# Patient Record
Sex: FEMALE | Race: BLACK OR AFRICAN AMERICAN | Employment: OTHER | URBAN - METROPOLITAN AREA
[De-identification: names, ages, dates, MRNs, and addresses within clinical notes are randomized per-mention and may not be internally consistent; named-entity substitution may affect disease eponyms.]

---

## 2021-08-01 ENCOUNTER — HOSPITAL ENCOUNTER (EMERGENCY)
Facility: HOSPITAL | Age: 31
Discharge: HOME | End: 2021-08-01
Attending: STUDENT IN AN ORGANIZED HEALTH CARE EDUCATION/TRAINING PROGRAM | Admitting: STUDENT IN AN ORGANIZED HEALTH CARE EDUCATION/TRAINING PROGRAM
Payer: COMMERCIAL

## 2021-08-01 VITALS
WEIGHT: 137 LBS | OXYGEN SATURATION: 100 % | RESPIRATION RATE: 18 BRPM | DIASTOLIC BLOOD PRESSURE: 82 MMHG | HEART RATE: 73 BPM | TEMPERATURE: 99.2 F | BODY MASS INDEX: 22.82 KG/M2 | SYSTOLIC BLOOD PRESSURE: 136 MMHG | HEIGHT: 65 IN

## 2021-08-01 DIAGNOSIS — J02.9 ACUTE PHARYNGITIS, UNSPECIFIED ETIOLOGY: ICD-10-CM

## 2021-08-01 PROCEDURE — 250N000013 HC RX MED GY IP 250 OP 250 PS 637: Performed by: EMERGENCY MEDICINE

## 2021-08-01 PROCEDURE — 99283 EMERGENCY DEPT VISIT LOW MDM: CPT

## 2021-08-01 RX ORDER — HYDROCODONE BITARTRATE AND ACETAMINOPHEN 5; 325 MG/1; MG/1
1 TABLET ORAL ONCE
Status: COMPLETED | OUTPATIENT
Start: 2021-08-01 | End: 2021-08-01

## 2021-08-01 RX ORDER — PENICILLIN V POTASSIUM 250 MG/1
500 TABLET, FILM COATED ORAL ONCE
Status: COMPLETED | OUTPATIENT
Start: 2021-08-01 | End: 2021-08-01

## 2021-08-01 RX ORDER — PENICILLIN V POTASSIUM 250 MG/1
500 TABLET, FILM COATED ORAL ONCE
Status: DISCONTINUED | OUTPATIENT
Start: 2021-08-01 | End: 2021-08-01

## 2021-08-01 RX ORDER — HYDROCODONE BITARTRATE AND ACETAMINOPHEN 5; 325 MG/1; MG/1
1 TABLET ORAL ONCE
Status: DISCONTINUED | OUTPATIENT
Start: 2021-08-01 | End: 2021-08-01

## 2021-08-01 RX ORDER — PENICILLIN V POTASSIUM 500 MG/1
500 TABLET, FILM COATED ORAL 4 TIMES DAILY
Qty: 28 TABLET | Refills: 0 | Status: SHIPPED | OUTPATIENT
Start: 2021-08-01 | End: 2021-08-08

## 2021-08-01 RX ORDER — HYDROCODONE BITARTRATE AND ACETAMINOPHEN 5; 325 MG/1; MG/1
1 TABLET ORAL EVERY 6 HOURS PRN
Qty: 4 TABLET | Refills: 0 | Status: SHIPPED | OUTPATIENT
Start: 2021-08-01 | End: 2021-08-04

## 2021-08-01 RX ADMIN — PENICILLIN V POTASIUM 500 MG: 250 TABLET ORAL at 21:42

## 2021-08-01 RX ADMIN — HYDROCODONE BITARTRATE AND ACETAMINOPHEN 1 TABLET: 5; 325 TABLET ORAL at 21:42

## 2021-08-01 ASSESSMENT — MIFFLIN-ST. JEOR: SCORE: 1337.31

## 2021-08-02 NOTE — DISCHARGE INSTRUCTIONS
Follow-up with the dentist tomorrow for reevaluation.  Continue penicillin for 7 days.  Take ibuprofen 600 mg every 8 hours and Tylenol 1 g every 8 hours for pain management may use Vicodin 1 every 8 hours for pain not controlled with Tylenol ibuprofen.  Return to the emergency department if progressive symptoms including increased pain in your throat or swelling, any difficulty with breathing or swallowing or development of fever

## 2021-08-02 NOTE — ED PROVIDER NOTES
"EMERGENCY DEPARTMENT NOTE     Name: Laura Bloom    Age/Sex: 31 year old female   MRN: [id number]   Evaluation Date & Time:  8/1/2021  8:04 PM    PCP:    No primary care provider on file.   ED Provider: Jerrod Fletcher D.O.       CHIEF COMPLAINT    chief complaint    DIAGNOSIS & DISPOSITION     1. Acute pharyngitis, unspecified etiology      DISPOSITION: Home    At the conclusion of the encounter I discussed the results of all of the tests and the disposition. The questions were answered. The patient or family acknowledged understanding and was agreeable with the care plan.    TOTAL CRITICAL CARE TIME (EXCLUDING PROCEDURES): Not applicable    PROCEDURES:   None    EMERGENCY DEPARTMENT COURSE/MEDICAL DECISION MAKING   8:34 PM I met with the patient to gather history and to perform my initial exam.  We discussed treatment options and the plan for care while in the Emergency Department.    Laura Bloom is a 31 year old female with no relevant past history on file who presents to the emergency department for evaluation of throat and left-sided neck pain since dental procedure on Thursday.  In contrast to the triage notes patient reports she had dental procedure described as feeling to the left upper dentition tooth #16.  She has developed some sore throat along the left side was concerned about possible swelling on that side and is also having some intermittent sharp discomfort along the left side of her neck and into her left ear.  Triage note reviewed:Tooth pain. Lower left side since Thursday. Has seen a dentist for this    Vital signs:/82   Pulse 73   Temp 99.2  F (37.3  C) (Oral)   Resp 18   Ht 1.651 m (5' 5\")   Wt 62.1 kg (137 lb)   LMP 07/27/2021   SpO2 100%   BMI 22.80 kg/m    Pertinent physical exam findings:    Diagnostic studies:  Imaging:None  Lab:None  Interventions: Oral penicillin, hydrocodone  Medical decision making: Discussed options with the patient and her .  Discussed further " evaluation with possible CT to definitively exclude deep space neck infection with recent dental procedure described as feeling of left upper dentition and now pain in the posterior throat and slight swelling.  Current exam shows mild edema versus swelling not particularly suspicious for deep space infection.  After discussion patient and her  are comfortable with discharge.  We will start patient on Pen-Vee K and was given short course of Vicodin to use as needed for pain management.  Patient will follow up with her dentist tomorrow for reevaluation.  If worsening pain or develops increased swelling on the left side of her throat or develops any difficulty with breathing or swallowing or has a fever will return the emergency department.    ED INTERVENTIONS     Medications   penicillin V (VEETID) tablet 500 mg (has no administration in time range)   HYDROcodone-acetaminophen (NORCO) 5-325 MG per tablet 1 tablet (has no administration in time range)       DISCHARGE MEDICATIONS        Review of your medicines      START taking      Dose / Directions   HYDROcodone-acetaminophen 5-325 MG tablet  Commonly known as: NORCO      Dose: 1 tablet  Take 1 tablet by mouth every 6 hours as needed for moderate to severe pain  Quantity: 4 tablet  Refills: 0     penicillin V 500 MG tablet  Commonly known as: VEETID      Dose: 500 mg  Take 1 tablet (500 mg) by mouth 4 times daily for 7 days  Quantity: 28 tablet  Refills: 0           Where to get your medicines      Some of these will need a paper prescription and others can be bought over the counter. Ask your nurse if you have questions.    Bring a paper prescription for each of these medications    HYDROcodone-acetaminophen 5-325 MG tablet    penicillin V 500 MG tablet           INFORMATION SOURCE AND LIMITATIONS    History/Exam limitations: none  Patient information was obtained from: patient,   Use of : Yes () - Language [place]    HISTORY OF  PRESENT ILLNESS   Laura A Nour female with a no relevant past history on file who presents to this ED by walk in for evaluation of left sided dental pain.    Patient reports she had a tooth filled on the top left side of her mouth about 2 days ago. She has had pain in that area as well as pain in her left tonsil and left ear. Patient thinks all this pain is related to her dental pain. She also notes some swelling. Denies any other complaints at this time.    REVIEW OF SYSTEMS:   Constitutional: Negative for  fever.   HENT: Positive for upper left dental pain, throat pain, ear pain. Negative for URI symptoms  Eyes: Negative for visual disturbance.   Cardiac: Negative for  chest pain,palpitations, near syncope or syncope  Respiratory: Negative for cough and shortness of breath.    Gastrointestinal: Negative for abdominal pain, nausea, vomiting, constipation, diarrhea, rectal bleeding or melena.  Genitourinary: Negative for dysuria, flank pain and hematuria.   Musculoskeletal: Negative for back pain.   Skin: Negative for  rash  Neurological: Negative for dizziness, headache, syncope, speech difficulty, unilateral weakness or imbalance with walking.   Hematological: Negative for adenopathy. Does not bruise/bleed easily.   Psychiatric/Behavioral: Negative for confusion.     PATIENT HISTORY   History reviewed. No pertinent past medical history.  There is no problem list on file for this patient.    History reviewed. No pertinent surgical history.  Social Histrory  Smoking:  Alcohol Use:  No Known Allergies      OUTPATIENT MEDICATIONS     New Prescriptions    HYDROCODONE-ACETAMINOPHEN (NORCO) 5-325 MG TABLET    Take 1 tablet by mouth every 6 hours as needed for moderate to severe pain    PENICILLIN V (VEETID) 500 MG TABLET    Take 1 tablet (500 mg) by mouth 4 times daily for 7 days      Vitals:    08/01/21 1907   BP: 136/82   Pulse: 73   Resp: 18   Temp: 99.2  F (37.3  C)   TempSrc: Oral   SpO2: 100%   Weight: 62.1 kg (137  "lb)   Height: 1.651 m (5' 5\")       Physical Exam   Constitutional: Oriented to person, place, and time. Appears well-developed and well-nourished.   HEENT:    Mouth/Throat:  Oropharynx is clear and moist.  Dentition appears to be intact and does not appear to have dental abscess or buccal cellulitis.  She has mild asymmetry of the left tonsillar pillar compared to the right but does not appear to have definitive deep space neck infection.  No sublingual or submandibular swelling.  No adenopathy along the left side of the neck, left TM is pink and external canal appears normal   Eyes: EOM are normal. Pupils are equal, round, and reactive to light.   Cardiovascular: Normal rate, regular rhythm and normal heart sounds.    Pulmonary/Chest: Normal effort  and breath sounds normal.       I, Drake Jones, am serving as a scribe to document services personally performed by Jerrod Fletcher D.O., based on my observation and the provider s statements to me.    I, Jerrod Fletcher D.O., attest that Drake Jones is acting in a scribe capacity, has observed my performance of the services and has documented them in accordance with my direction.    Jerrod Fletcher D.O.  EMERGENCY MEDICINE   08/01/21  [facility] EMERGENCY DEPARTMENT  [facility] [facility] [id number] [phone]  Dept: [phone]     Jerrod Fletcher DO  08/02/21 0213    "